# Patient Record
Sex: MALE | Race: WHITE | NOT HISPANIC OR LATINO | Employment: FULL TIME | URBAN - METROPOLITAN AREA
[De-identification: names, ages, dates, MRNs, and addresses within clinical notes are randomized per-mention and may not be internally consistent; named-entity substitution may affect disease eponyms.]

---

## 2019-01-16 ENCOUNTER — TRANSCRIBE ORDERS (OUTPATIENT)
Dept: PHYSICAL THERAPY | Facility: CLINIC | Age: 24
End: 2019-01-16

## 2019-01-16 DIAGNOSIS — S39.012A STRAIN OF LUMBAR REGION, INITIAL ENCOUNTER: Primary | ICD-10-CM

## 2019-01-21 ENCOUNTER — TREATMENT (OUTPATIENT)
Dept: PHYSICAL THERAPY | Facility: CLINIC | Age: 24
End: 2019-01-21

## 2019-01-21 DIAGNOSIS — M54.5 LOW BACK PAIN, UNSPECIFIED BACK PAIN LATERALITY, UNSPECIFIED CHRONICITY, WITH SCIATICA PRESENCE UNSPECIFIED: Primary | ICD-10-CM

## 2019-01-21 PROCEDURE — 97110 THERAPEUTIC EXERCISES: CPT | Performed by: PHYSICAL THERAPIST

## 2019-01-21 PROCEDURE — 97161 PT EVAL LOW COMPLEX 20 MIN: CPT | Performed by: PHYSICAL THERAPIST

## 2019-01-21 PROCEDURE — 97140 MANUAL THERAPY 1/> REGIONS: CPT | Performed by: PHYSICAL THERAPIST

## 2019-01-21 NOTE — PROGRESS NOTES
Physical Therapy Initial Evaluation and Plan of Care    Patient: Thien Hughes   : 1995  Diagnosis/ICD-10 Code:  Low back pain, unspecified back pain laterality, unspecified chronicity, with sciatica presence unspecified [M54.5]  Referring practitioner: NICOLE Bob  Date of Initial Visit: 2019  Today's Date: 2019  Patient seen for 1 sessions             Subjective Evaluation    History of Present Illness  Date of onset: 2019  Mechanism of injury: Pt works at [employer]. He was working bending into a car to move a console to the center and started to notice some pain in the low back with bending over. He had progressively more pain with repeated motion in that position. When he first noticed the pain he noticed some burning in the low back but he was not having any pain into the buttocks of legs. At this time he has trouble with getting comfortable in a prolonged position. He feels okay in the mornings but when at work and sitting for a long time his back starts to hurt. Pt denies any pain going down into the legs or any numbness/tingling into the legs.       Patient Occupation: Works at [employer] - works on [employer] up to [employer] Quality of life: good    Pain  Current pain ratin  At best pain ratin  At worst pain ratin  Location: pain tends to change from the right to left sides and also is painful in the middle   Quality: sharp and burning  Relieving factors: ice, heat and change in position (laying flat on his back)  Exacerbated by: prolonged sitting.  Progression: improved    Diagnostic Tests  No diagnostic tests performed    Treatments  No previous or current treatments  Patient Goals  Patient goals for therapy: decreased pain, increased motion, increased strength and return to work             Objective       Palpation     Additional Palpation Details  TTP at the lower lumbar vertebral segments, jonel QL's, and jonel SI joints     Active Range of Motion     Lumbar    Flexion: Active lumbar flexion: 75%   Extension: Active lumbar extension: 75%   Left lateral flexion: Active left lumbar lateral flexion: 75%   Right lateral flexion: Active right lumbar lateral flexion: 75%     Strength/Myotome Testing     Lumbar   Left   Normal strength    Right   Normal strength    Left Hip   Planes of Motion   Extension: 4-  Abduction: 4-    Right Hip   Planes of Motion   Extension: 4-  Abduction: 4-    Tests     Lumbar     Left   Negative passive SLR.     Right   Negative passive SLR.     Left Hip   Negative CAITLIN.     Right Hip   Negative CAITLIN.     Additional Tests Details  Supine leg length test + for anterior rotation of left innominate          Assessment & Plan     Assessment  Impairments: abnormal muscle tone, abnormal or restricted ROM, activity intolerance, impaired physical strength, lacks appropriate home exercise program and pain with function  Assessment details: Patient is a 23 year old male who comes to physical therapy with c/o pain in the low back. Signs and symptoms are consistent with generalized low back strain with muscle guarding related to pelvic obliquity resulting in pain, decreased ROM, decreased strength, and inability to perform all essential functional activities. Pt will benefit from skilled PT services to address the above issues.     Prognosis details:   SHORT TERM GOALS:     2 weeks  1. Pt independent with HEP  2. Pt to demonstrate trunk AROM 50-75% of expected norms to allow for improved ability to perform ADL's  3. Pt to demonstrate bilateral hip strength 4/5 in all planes to improved stability of the core/trunk     LONG TERM GOALS:   6 weeks  1. Pt to demonstrate trunk AROM % of expected norms to allow for improved ability to perform functional activities  2. Pt to demonstrate ability to perform full functional squat with good form and without increased pain in the low back   3. Pt to report being able to work full shift or work in the home without  increase in pain in the back    Functional Limitations: carrying objects, lifting, pulling, pushing, uncomfortable because of pain, sitting and unable to perform repetitive tasks  Plan  Therapy options: will be seen for skilled physical therapy services  Planned modality interventions: cryotherapy, high voltage pulsed current (pain management), iontophoresis, microcurrent electrical stimulation, TENS, thermotherapy (hydrocollator packs), ultrasound and traction  Planned therapy interventions: ADL retraining, body mechanics training, flexibility, functional ROM exercises, home exercise program, IADL retraining, joint mobilization, manual therapy, motor coordination training, neuromuscular re-education, postural training, soft tissue mobilization, spinal/joint mobilization, strengthening, stretching and abdominal trunk stabilization  Frequency: 2x week  Duration in weeks: 6  Treatment plan discussed with: patient        Manual Therapy:    14     mins  22148;  Therapeutic Exercise:    15     mins  05311;     Neuromuscular Brad:        mins  64588;    Therapeutic Activity:          mins  19351;     Gait Training:           mins  68704;     Ultrasound:          mins  32387;    Electrical Stimulation:         mins  89106 ( );  Iontophoresis          mins 10935   Traction          mins  26481  Fluidotherapy          mins  24694  Dry Needling          mins self-pay  Paraffin          mins  22818    Timed Treatment:   29   mins   Total Treatment:     55   mins    PT SIGNATURE: Homero Saenz, PT, DPT, OCS, Cert. DN   DATE TREATMENT INITIATED: 1/21/2019    Initial Certification  Certification Period: 4/21/2019  I certify that the therapy services are furnished while this patient is under my care.  The services outlined above are required by this patient, and will be reviewed every 90 days.     PHYSICIAN: Louise Phipps, APRN      DATE:     Please sign and return via fax to [phone].. Thank you, [facility]  Physical Therapy.

## 2019-01-24 ENCOUNTER — TREATMENT (OUTPATIENT)
Dept: PHYSICAL THERAPY | Facility: CLINIC | Age: 24
End: 2019-01-24

## 2019-01-24 DIAGNOSIS — M54.5 LOW BACK PAIN, UNSPECIFIED BACK PAIN LATERALITY, UNSPECIFIED CHRONICITY, WITH SCIATICA PRESENCE UNSPECIFIED: Primary | ICD-10-CM

## 2019-01-24 PROCEDURE — 97110 THERAPEUTIC EXERCISES: CPT | Performed by: PHYSICAL THERAPIST

## 2019-01-24 PROCEDURE — 97112 NEUROMUSCULAR REEDUCATION: CPT | Performed by: PHYSICAL THERAPIST

## 2019-01-28 NOTE — PROGRESS NOTES
Physical Therapy Daily Progress Note    Subjective   Thien Hughes reports that he is feeling a lot better since his first visit. He has been trying to stand and walk more while at work and he has been stretching at home as well.       Objective   See Exercise, Manual, and Modality Logs for complete treatment.       Assessment/Plan     Pt is well motivated and demonstrates compliance with his HEP. He has progressed well with treatment thus far.     Progress per Plan of Care and Progress strengthening /stabilization /functional activity           Manual Therapy:         mins  96128;  Therapeutic Exercise:    32     mins  52608;     Neuromuscular Brad:    30    mins  98580;    Therapeutic Activity:          mins  14146;     Gait Training:           mins  91960;     Ultrasound:          mins  28258;    Electrical Stimulation:         mins  36640 ( );  Iontophoresis          mins 64463   Traction          mins  59837  Fluidotherapy          mins  91912  Dry Needling          mins self-pay  Paraffin          mins  05767    Timed Treatment:   62   mins   Total Treatment:     62   mins    Homero Saenz, PT, DPT, OCS, Cert. DN  Physical Therapist

## 2019-01-29 ENCOUNTER — TREATMENT (OUTPATIENT)
Dept: PHYSICAL THERAPY | Facility: CLINIC | Age: 24
End: 2019-01-29

## 2019-01-29 DIAGNOSIS — M54.5 LOW BACK PAIN, UNSPECIFIED BACK PAIN LATERALITY, UNSPECIFIED CHRONICITY, WITH SCIATICA PRESENCE UNSPECIFIED: Primary | ICD-10-CM

## 2019-01-29 PROCEDURE — 97110 THERAPEUTIC EXERCISES: CPT | Performed by: PHYSICAL THERAPIST

## 2019-01-29 PROCEDURE — 97112 NEUROMUSCULAR REEDUCATION: CPT | Performed by: PHYSICAL THERAPIST

## 2019-01-29 PROCEDURE — 97140 MANUAL THERAPY 1/> REGIONS: CPT | Performed by: PHYSICAL THERAPIST

## 2019-01-30 NOTE — PROGRESS NOTES
Physical Therapy Daily Progress Note    Subjective   Thien Hughes reports that he has been doing a lot better since his initial visit. He still has some discomfort if he sits for a prolonged period of time but he is able to move and stretch and decrease the pain.       Objective   See Exercise, Manual, and Modality Logs for complete treatment.       Assessment/Plan     Slightly decrease mobility noted in the lumbar spine with rotational mobs. Pt responded well to manual therapy and he was able to tolerate more exercise in the clinic without c/o increase in pain.     Progress per Plan of Care and Progress strengthening /stabilization /functional activity           Manual Therapy:    14     mins  36017;  Therapeutic Exercise:    30     mins  34448;     Neuromuscular Brad:    18    mins  67957;    Therapeutic Activity:          mins  41726;     Gait Training:           mins  96222;     Ultrasound:          mins  74459;    Electrical Stimulation:    20     mins  80410 ( );  Iontophoresis          mins 02624   Traction          mins  10761  Fluidotherapy          mins  32447  Dry Needling          mins self-pay  Paraffin          mins  02867    Timed Treatment:   62   mins   Total Treatment:     82   mins    Homero Saenz, PT, DPT, OCS, Cert. DN  Physical Therapist

## 2019-01-31 ENCOUNTER — TREATMENT (OUTPATIENT)
Dept: PHYSICAL THERAPY | Facility: CLINIC | Age: 24
End: 2019-01-31

## 2019-01-31 DIAGNOSIS — M54.5 LOW BACK PAIN, UNSPECIFIED BACK PAIN LATERALITY, UNSPECIFIED CHRONICITY, WITH SCIATICA PRESENCE UNSPECIFIED: Primary | ICD-10-CM

## 2019-01-31 PROCEDURE — 97530 THERAPEUTIC ACTIVITIES: CPT | Performed by: PHYSICAL THERAPIST

## 2019-01-31 PROCEDURE — 97110 THERAPEUTIC EXERCISES: CPT | Performed by: PHYSICAL THERAPIST

## 2019-02-05 ENCOUNTER — TREATMENT (OUTPATIENT)
Dept: PHYSICAL THERAPY | Facility: CLINIC | Age: 24
End: 2019-02-05

## 2019-02-05 DIAGNOSIS — M54.5 LOW BACK PAIN, UNSPECIFIED BACK PAIN LATERALITY, UNSPECIFIED CHRONICITY, WITH SCIATICA PRESENCE UNSPECIFIED: Primary | ICD-10-CM

## 2019-02-05 PROCEDURE — 97140 MANUAL THERAPY 1/> REGIONS: CPT | Performed by: PHYSICAL THERAPIST

## 2019-02-05 PROCEDURE — 97110 THERAPEUTIC EXERCISES: CPT | Performed by: PHYSICAL THERAPIST

## 2019-02-05 PROCEDURE — 97530 THERAPEUTIC ACTIVITIES: CPT | Performed by: PHYSICAL THERAPIST

## 2019-02-05 PROCEDURE — 97014 ELECTRIC STIMULATION THERAPY: CPT | Performed by: PHYSICAL THERAPIST

## 2019-02-07 NOTE — PROGRESS NOTES
Physical Therapy Daily Progress Note    Subjective   Thien Hughes reports that he is having a little soreness in the low back, but he also started back to work at his usual job today.       Objective   See Exercise, Manual, and Modality Logs for complete treatment.       Assessment/Plan     Pt had some increase in pain today, but after stretching and manual therapy his pain decreased. Will assess at the next visit and progress as appropriate.     Progress per Plan of Care and Progress strengthening /stabilization /functional activity           Manual Therapy:    14     mins  00942;  Therapeutic Exercise:    16     mins  94964;     Neuromuscular Brad:        mins  49826;    Therapeutic Activity:     18     mins  97891;     Gait Training:           mins  21904;     Ultrasound:          mins  56268;    Electrical Stimulation:    20     mins  24974 ( );  Iontophoresis          mins 24362   Traction          mins  37372  Fluidotherapy          mins  46142  Dry Needling          mins self-pay  Paraffin          mins  31935    Timed Treatment:   48   mins   Total Treatment:     68   mins    Homero Saenz, PT, DPT, OCS, Cert. DN  Physical Therapist

## 2019-02-11 ENCOUNTER — TREATMENT (OUTPATIENT)
Dept: PHYSICAL THERAPY | Facility: CLINIC | Age: 24
End: 2019-02-11

## 2019-02-11 DIAGNOSIS — M54.5 LOW BACK PAIN, UNSPECIFIED BACK PAIN LATERALITY, UNSPECIFIED CHRONICITY, WITH SCIATICA PRESENCE UNSPECIFIED: Primary | ICD-10-CM

## 2019-02-11 PROCEDURE — 97110 THERAPEUTIC EXERCISES: CPT | Performed by: PHYSICAL THERAPIST

## 2019-02-11 PROCEDURE — 97530 THERAPEUTIC ACTIVITIES: CPT | Performed by: PHYSICAL THERAPIST

## 2019-02-11 PROCEDURE — 97140 MANUAL THERAPY 1/> REGIONS: CPT | Performed by: PHYSICAL THERAPIST

## 2019-02-14 NOTE — PROGRESS NOTES
Physical Therapy Daily Progress Note    Subjective   Thien Hughes reports that he is feeling good overall, he gets a little soreness when working, but he will be changing jobs.       Objective   See Exercise, Manual, and Modality Logs for complete treatment.       Assessment/Plan     Pt responded very well to therapy and he was able to do all exercises in the clinic without having an exacerbation of symptoms. Pt is appropriate for DC at this time.     Other           Manual Therapy:    12     mins  13783;  Therapeutic Exercise:    18     mins  50614;     Neuromuscular Brad:        mins  16624;    Therapeutic Activity:     32     mins  14717;     Gait Training:           mins  81226;     Ultrasound:          mins  35475;    Electrical Stimulation:    20     mins  52876 ( );  Iontophoresis          mins 15739   Traction          mins  66355  Fluidotherapy          mins  94380  Dry Needling          mins self-pay  Paraffin          mins  61899    Timed Treatment:   62   mins   Total Treatment:     82   mins    Homero Saenz, PT, DPT, OCS, Cert. DN  Physical Therapist